# Patient Record
Sex: MALE | Race: OTHER | Employment: UNEMPLOYED | URBAN - METROPOLITAN AREA
[De-identification: names, ages, dates, MRNs, and addresses within clinical notes are randomized per-mention and may not be internally consistent; named-entity substitution may affect disease eponyms.]

---

## 2021-05-24 ENCOUNTER — HOSPITAL ENCOUNTER (OUTPATIENT)
Dept: LAB | Age: 51
Discharge: HOME | End: 2021-05-24

## 2021-05-24 PROCEDURE — 83036 HEMOGLOBIN GLYCOSYLATED A1C: CPT

## 2021-05-24 PROCEDURE — 82043 UR ALBUMIN QUANTITATIVE: CPT

## 2021-05-25 LAB
COMMENT, HOLDF: NORMAL
CREAT UR-MCNC: 206 MG/DL
EST. AVERAGE GLUCOSE BLD GHB EST-MCNC: 114 MG/DL
HBA1C MFR BLD: 5.6 % (ref 4–5.6)
MICROALBUMIN UR-MCNC: 0.78 MG/DL
MICROALBUMIN/CREAT UR-RTO: 4 MG/G (ref 0–30)
SAMPLES BEING HELD,HOLD: NORMAL

## 2021-06-07 ENCOUNTER — HOSPITAL ENCOUNTER (OUTPATIENT)
Dept: LAB | Age: 51
Discharge: HOME | End: 2021-06-07

## 2021-06-14 ENCOUNTER — HOSPITAL ENCOUNTER (OUTPATIENT)
Dept: LAB | Age: 51
Discharge: HOME | End: 2021-06-14

## 2021-06-14 PROCEDURE — 80061 LIPID PANEL: CPT

## 2021-06-14 PROCEDURE — 80053 COMPREHEN METABOLIC PANEL: CPT

## 2021-06-15 LAB
ALBUMIN SERPL-MCNC: 4 G/DL (ref 3.5–5)
ALBUMIN/GLOB SERPL: 1.4 {RATIO} (ref 1.1–2.2)
ALP SERPL-CCNC: 99 U/L (ref 45–117)
ALT SERPL-CCNC: 35 U/L (ref 12–78)
ANION GAP SERPL CALC-SCNC: 7 MMOL/L (ref 5–15)
AST SERPL-CCNC: 17 U/L (ref 15–37)
BILIRUB SERPL-MCNC: 0.4 MG/DL (ref 0.2–1)
BUN SERPL-MCNC: 18 MG/DL (ref 6–20)
BUN/CREAT SERPL: 30 (ref 12–20)
CALCIUM SERPL-MCNC: 8.4 MG/DL (ref 8.5–10.1)
CHLORIDE SERPL-SCNC: 109 MMOL/L (ref 97–108)
CHOLEST SERPL-MCNC: 135 MG/DL
CO2 SERPL-SCNC: 24 MMOL/L (ref 21–32)
CREAT SERPL-MCNC: 0.61 MG/DL (ref 0.7–1.3)
GLOBULIN SER CALC-MCNC: 2.8 G/DL (ref 2–4)
GLUCOSE SERPL-MCNC: 90 MG/DL (ref 65–100)
HDLC SERPL-MCNC: 38 MG/DL
HDLC SERPL: 3.6 {RATIO} (ref 0–5)
LDLC SERPL CALC-MCNC: 84 MG/DL (ref 0–100)
POTASSIUM SERPL-SCNC: 4.4 MMOL/L (ref 3.5–5.1)
PROT SERPL-MCNC: 6.8 G/DL (ref 6.4–8.2)
SODIUM SERPL-SCNC: 140 MMOL/L (ref 136–145)
TRIGL SERPL-MCNC: 65 MG/DL (ref ?–150)
VLDLC SERPL CALC-MCNC: 13 MG/DL

## 2021-08-30 ENCOUNTER — HOSPITAL ENCOUNTER (OUTPATIENT)
Dept: LAB | Age: 51
Discharge: HOME | End: 2021-08-30

## 2021-08-30 PROCEDURE — 80061 LIPID PANEL: CPT

## 2021-08-30 PROCEDURE — 83036 HEMOGLOBIN GLYCOSYLATED A1C: CPT

## 2021-08-31 LAB
CHOLEST SERPL-MCNC: 144 MG/DL
EST. AVERAGE GLUCOSE BLD GHB EST-MCNC: 126 MG/DL
HBA1C MFR BLD: 6 % (ref 4–5.6)
HDLC SERPL-MCNC: 42 MG/DL
HDLC SERPL: 3.4 {RATIO} (ref 0–5)
LDLC SERPL CALC-MCNC: 86.2 MG/DL (ref 0–100)
TRIGL SERPL-MCNC: 79 MG/DL (ref ?–150)
VLDLC SERPL CALC-MCNC: 15.8 MG/DL

## 2022-02-07 ENCOUNTER — HOSPITAL ENCOUNTER (OUTPATIENT)
Dept: LAB | Age: 52
Discharge: HOME | End: 2022-02-07

## 2022-02-07 PROCEDURE — 80061 LIPID PANEL: CPT

## 2022-02-07 PROCEDURE — 83036 HEMOGLOBIN GLYCOSYLATED A1C: CPT

## 2022-02-08 LAB
CHOLEST SERPL-MCNC: 166 MG/DL
EST. AVERAGE GLUCOSE BLD GHB EST-MCNC: 223 MG/DL
HBA1C MFR BLD: 9.4 % (ref 4–5.6)
HDLC SERPL-MCNC: 42 MG/DL
HDLC SERPL: 4 {RATIO} (ref 0–5)
LDLC SERPL CALC-MCNC: 100.4 MG/DL (ref 0–100)
TRIGL SERPL-MCNC: 118 MG/DL (ref ?–150)
VLDLC SERPL CALC-MCNC: 23.6 MG/DL

## 2023-10-19 ENCOUNTER — OFFICE VISIT (OUTPATIENT)
Age: 53
End: 2023-10-19

## 2023-10-19 VITALS
RESPIRATION RATE: 18 BRPM | BODY MASS INDEX: 25.49 KG/M2 | OXYGEN SATURATION: 98 % | HEART RATE: 66 BPM | SYSTOLIC BLOOD PRESSURE: 130 MMHG | DIASTOLIC BLOOD PRESSURE: 79 MMHG | WEIGHT: 153 LBS | TEMPERATURE: 98 F | HEIGHT: 65 IN

## 2023-10-19 DIAGNOSIS — Z76.89 ENCOUNTER TO ESTABLISH CARE: Primary | ICD-10-CM

## 2023-10-19 DIAGNOSIS — E78.5 HYPERLIPIDEMIA, UNSPECIFIED HYPERLIPIDEMIA TYPE: ICD-10-CM

## 2023-10-19 DIAGNOSIS — E11.9 TYPE 2 DIABETES MELLITUS WITHOUT COMPLICATION, WITHOUT LONG-TERM CURRENT USE OF INSULIN (HCC): ICD-10-CM

## 2023-10-19 PROCEDURE — 99203 OFFICE O/P NEW LOW 30 MIN: CPT | Performed by: CLINICAL NURSE SPECIALIST

## 2023-10-19 RX ORDER — GLIPIZIDE 5 MG/1
5 TABLET, FILM COATED, EXTENDED RELEASE ORAL DAILY
Qty: 30 TABLET | Refills: 3 | Status: SHIPPED | OUTPATIENT
Start: 2023-10-19

## 2023-10-19 SDOH — ECONOMIC STABILITY: INCOME INSECURITY: HOW HARD IS IT FOR YOU TO PAY FOR THE VERY BASICS LIKE FOOD, HOUSING, MEDICAL CARE, AND HEATING?: NOT HARD AT ALL

## 2023-10-19 SDOH — ECONOMIC STABILITY: HOUSING INSECURITY
IN THE LAST 12 MONTHS, WAS THERE A TIME WHEN YOU DID NOT HAVE A STEADY PLACE TO SLEEP OR SLEPT IN A SHELTER (INCLUDING NOW)?: NO

## 2023-10-19 SDOH — ECONOMIC STABILITY: FOOD INSECURITY: WITHIN THE PAST 12 MONTHS, YOU WORRIED THAT YOUR FOOD WOULD RUN OUT BEFORE YOU GOT MONEY TO BUY MORE.: NEVER TRUE

## 2023-10-19 SDOH — ECONOMIC STABILITY: FOOD INSECURITY: WITHIN THE PAST 12 MONTHS, THE FOOD YOU BOUGHT JUST DIDN'T LAST AND YOU DIDN'T HAVE MONEY TO GET MORE.: NEVER TRUE

## 2023-10-19 ASSESSMENT — PATIENT HEALTH QUESTIONNAIRE - PHQ9
SUM OF ALL RESPONSES TO PHQ QUESTIONS 1-9: 0
SUM OF ALL RESPONSES TO PHQ9 QUESTIONS 1 & 2: 0
2. FEELING DOWN, DEPRESSED OR HOPELESS: 0
SUM OF ALL RESPONSES TO PHQ QUESTIONS 1-9: 0
1. LITTLE INTEREST OR PLEASURE IN DOING THINGS: 0

## 2023-10-19 ASSESSMENT — ENCOUNTER SYMPTOMS
RESPIRATORY NEGATIVE: 1
GASTROINTESTINAL NEGATIVE: 1

## 2023-10-19 NOTE — PROGRESS NOTES
1. \"Have you been to the ER, urgent care clinic since your last visit? Hospitalized since your last visit? \"   NO    2. \"Have you seen or consulted any other health care providers outside of the [address] [place] [address] since your last visit? \"   NEW PATIENT    3. For patients aged 43-73: Has the patient had a colonoscopy / FIT/ Cologuard? NO    If the patient is female:    4. For patients aged 43-66: Has the patient had a mammogram within the past 2 years? NA    5. For patients aged 21-65: Has the patient had a pap smear?    NA

## 2023-10-19 NOTE — PROGRESS NOTES
Subjective:      Patient ID: Oliver Schulz is a 48 y.o. male. He presents to establish care. States that he is generally doing. Past medical history significant for diabetes and hyperlipidemia. Was taking a statin, but stopped as he was told that his cholesterol was good. Was on metformin for about 14 years, but discontinued it at the end of July. At that time, was in [facility], was feeling unwell and had lost a lot of weight. Went to the doctor and that his sugar was high, doctor told him that metformin may be causing weight loss. Metformin was discontinued and he was started on Januvia 100 mg daily. States that he has been tolerating this medication, feels that his weight has improved and that he overall is doing better. Only has 1 Januvia tablet remaining. Would like to continue Januvia, but uninsured and cannot afford out of pocket cost. Would like to contact his provider in [facility] to see if he can have the medication sent here by family. In the meantime, open to taking an alternative but would prefer to avoid metformin. Review of Systems   Constitutional: Negative. Respiratory: Negative. Cardiovascular: Negative. Gastrointestinal: Negative. Neurological: Negative. Objective:   Physical Exam  Vitals reviewed. Constitutional:       Appearance: He is well-groomed. Cardiovascular:      Rate and Rhythm: Normal rate and regular rhythm. Pulses: Normal pulses. Heart sounds: Normal heart sounds. Neurological:      Mental Status: He is alert and oriented to person, place, and time. Psychiatric:      Comments: pleasant         Assessment / Plan:      1. Encounter to establish care  Referred to the [facility]. Encouraged to establish care as soon as able. 2. Type 2 diabetes mellitus without complication, without long-term current use of insulin ([address])  Reassured that weight loss was likely r/t uncontrolled hyperflycemia.    Printed RX given for